# Patient Record
Sex: MALE | Race: WHITE | NOT HISPANIC OR LATINO | Employment: OTHER | URBAN - METROPOLITAN AREA
[De-identification: names, ages, dates, MRNs, and addresses within clinical notes are randomized per-mention and may not be internally consistent; named-entity substitution may affect disease eponyms.]

---

## 2022-01-14 ENCOUNTER — HOSPITAL ENCOUNTER (EMERGENCY)
Facility: MEDICAL CENTER | Age: 82
End: 2022-01-14
Attending: EMERGENCY MEDICINE
Payer: MEDICARE

## 2022-01-14 ENCOUNTER — APPOINTMENT (OUTPATIENT)
Dept: RADIOLOGY | Facility: MEDICAL CENTER | Age: 82
End: 2022-01-14
Attending: EMERGENCY MEDICINE
Payer: MEDICARE

## 2022-01-14 VITALS
HEART RATE: 77 BPM | WEIGHT: 171.3 LBS | TEMPERATURE: 97.2 F | BODY MASS INDEX: 27.53 KG/M2 | HEIGHT: 66 IN | SYSTOLIC BLOOD PRESSURE: 167 MMHG | DIASTOLIC BLOOD PRESSURE: 101 MMHG | RESPIRATION RATE: 16 BRPM | OXYGEN SATURATION: 94 %

## 2022-01-14 DIAGNOSIS — R07.89 LEFT-SIDED CHEST WALL PAIN: ICD-10-CM
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
## 2022-01-14 LAB
ALBUMIN SERPL BCP-MCNC: 4.2 G/DL (ref 3.2–4.9)
ALBUMIN/GLOB SERPL: 1.4 G/DL
ALP SERPL-CCNC: 91 U/L (ref 30–99)
ALT SERPL-CCNC: 18 U/L (ref 2–50)
ANION GAP SERPL CALC-SCNC: 14 MMOL/L (ref 7–16)
AST SERPL-CCNC: 26 U/L (ref 12–45)
BASOPHILS # BLD AUTO: 0.4 % (ref 0–1.8)
BASOPHILS # BLD: 0.05 K/UL (ref 0–0.12)
BILIRUB SERPL-MCNC: 0.5 MG/DL (ref 0.1–1.5)
BUN SERPL-MCNC: 19 MG/DL (ref 8–22)
CALCIUM SERPL-MCNC: 8.7 MG/DL (ref 8.4–10.2)
CHLORIDE SERPL-SCNC: 99 MMOL/L (ref 96–112)
CO2 SERPL-SCNC: 21 MMOL/L (ref 20–33)
CREAT SERPL-MCNC: 1.1 MG/DL (ref 0.5–1.4)
EOSINOPHIL # BLD AUTO: 0.05 K/UL (ref 0–0.51)
EOSINOPHIL NFR BLD: 0.4 % (ref 0–6.9)
ERYTHROCYTE [DISTWIDTH] IN BLOOD BY AUTOMATED COUNT: 41.7 FL (ref 35.9–50)
GLOBULIN SER CALC-MCNC: 2.9 G/DL (ref 1.9–3.5)
GLUCOSE SERPL-MCNC: 118 MG/DL (ref 65–99)
HCT VFR BLD AUTO: 39.6 % (ref 42–52)
HGB BLD-MCNC: 13.9 G/DL (ref 14–18)
IMM GRANULOCYTES # BLD AUTO: 0.07 K/UL (ref 0–0.11)
IMM GRANULOCYTES NFR BLD AUTO: 0.6 % (ref 0–0.9)
INR PPP: 1.11 (ref 0.87–1.13)
LYMPHOCYTES # BLD AUTO: 0.93 K/UL (ref 1–4.8)
LYMPHOCYTES NFR BLD: 8.3 % (ref 22–41)
MCH RBC QN AUTO: 31.1 PG (ref 27–33)
MCHC RBC AUTO-ENTMCNC: 35.1 G/DL (ref 33.7–35.3)
MCV RBC AUTO: 88.6 FL (ref 81.4–97.8)
MONOCYTES # BLD AUTO: 0.86 K/UL (ref 0–0.85)
MONOCYTES NFR BLD AUTO: 7.7 % (ref 0–13.4)
NEUTROPHILS # BLD AUTO: 9.23 K/UL (ref 1.82–7.42)
NEUTROPHILS NFR BLD: 82.6 % (ref 44–72)
NRBC # BLD AUTO: 0 K/UL
NRBC BLD-RTO: 0 /100 WBC
PLATELET # BLD AUTO: 235 K/UL (ref 164–446)
PMV BLD AUTO: 9.4 FL (ref 9–12.9)
POTASSIUM SERPL-SCNC: 3.6 MMOL/L (ref 3.6–5.5)
PROT SERPL-MCNC: 7.1 G/DL (ref 6–8.2)
PROTHROMBIN TIME: 13.4 SEC (ref 12–14.6)
RBC # BLD AUTO: 4.47 M/UL (ref 4.7–6.1)
SODIUM SERPL-SCNC: 134 MMOL/L (ref 135–145)
WBC # BLD AUTO: 11.2 K/UL (ref 4.8–10.8)

## 2022-01-14 PROCEDURE — A9270 NON-COVERED ITEM OR SERVICE: HCPCS | Performed by: EMERGENCY MEDICINE

## 2022-01-14 PROCEDURE — 99284 EMERGENCY DEPT VISIT MOD MDM: CPT

## 2022-01-14 PROCEDURE — 70450 CT HEAD/BRAIN W/O DYE: CPT | Mod: ME

## 2022-01-14 PROCEDURE — 36415 COLL VENOUS BLD VENIPUNCTURE: CPT

## 2022-01-14 PROCEDURE — 72131 CT LUMBAR SPINE W/O DYE: CPT | Mod: ME

## 2022-01-14 PROCEDURE — 72125 CT NECK SPINE W/O DYE: CPT | Mod: ME

## 2022-01-14 PROCEDURE — 71260 CT THORAX DX C+: CPT | Mod: ME

## 2022-01-14 PROCEDURE — 85610 PROTHROMBIN TIME: CPT

## 2022-01-14 PROCEDURE — 80053 COMPREHEN METABOLIC PANEL: CPT

## 2022-01-14 PROCEDURE — 700102 HCHG RX REV CODE 250 W/ 637 OVERRIDE(OP): Performed by: EMERGENCY MEDICINE

## 2022-01-14 PROCEDURE — 85025 COMPLETE CBC W/AUTO DIFF WBC: CPT

## 2022-01-14 PROCEDURE — 72128 CT CHEST SPINE W/O DYE: CPT | Mod: ME

## 2022-01-14 PROCEDURE — 700117 HCHG RX CONTRAST REV CODE 255: Performed by: EMERGENCY MEDICINE

## 2022-01-14 RX ORDER — HYDROCODONE BITARTRATE AND ACETAMINOPHEN 5; 325 MG/1; MG/1
1 TABLET ORAL ONCE
Status: COMPLETED | OUTPATIENT
Start: 2022-01-14 | End: 2022-01-14

## 2022-01-14 RX ORDER — METAXALONE 800 MG/1
400 TABLET ORAL 3 TIMES DAILY PRN
Qty: 20 TABLET | Refills: 0 | Status: SHIPPED | OUTPATIENT
Start: 2022-01-14 | End: 2022-01-19

## 2022-01-14 RX ORDER — HYDROCODONE BITARTRATE AND ACETAMINOPHEN 5; 325 MG/1; MG/1
1 TABLET ORAL EVERY 6 HOURS PRN
Qty: 13 TABLET | Refills: 0 | Status: SHIPPED | OUTPATIENT
Start: 2022-01-14 | End: 2022-01-17

## 2022-01-14 RX ADMIN — HYDROCODONE BITARTRATE AND ACETAMINOPHEN 1 TABLET: 5; 325 TABLET ORAL at 19:04

## 2022-01-14 RX ADMIN — IOHEXOL 100 ML: 350 INJECTION, SOLUTION INTRAVENOUS at 20:06

## 2022-01-15 NOTE — ED TRIAGE NOTES
Pt BIB family  Pt slipped and fell on ice today  injured L side  Continues to have L upper back pain and now having difficulty at taking deep breaths  Had a hard time getting up for laying down later today after laying srinath to rest

## 2022-01-15 NOTE — DISCHARGE INSTRUCTIONS
Please return to the emergency department tomorrow if your pain is not well controlled despite taking the pain medication.  As we discussed the medications may make you drowsy, please exercise caution to make sure that you do not fall while on these medications.  Please call your primary care physician when you return home for complete recheck.

## 2022-01-15 NOTE — ED PROVIDER NOTES
ED Provider Note    Chief Complaint:   Fall, left chest wall pain    HPI:  Dangelo Gutierrez Jr. is a very pleasant 81-year-old gentleman who presents to the emergency department for evaluation of left-sided chest wall pain after a slip and fall on ice earlier today.  He fell this morning, and has had persistent pain localized to the left chest wall.  He reports no hip pain, no difficulty with ambulation, no lower extremity pain.  He does have a small bruise to the left elbow, but is easily able to flex and extend the elbow without significant pain or discomfort.  He is not currently on any anticoagulation or antiplatelet agents.  States that he did not strike his head, and is not having neck pain or midline back pain.  He reports no weakness in the arms or legs.  He reports no facial injury.  He has no other concerns at this time.  He states that his chest wall pain is improved when laying still, however he describes exacerbation with movement, a sharp shooting pain that is moderate to severe with movement.  He reports no significant past medical history.  He did not sustain any lacerations or abrasions.  He has no other concerns at this time.  His fall was due to a slip on ice, he had no preceding lightheadedness, no loss of balance, no chest pain or palpitations.    Review of Systems:  See HPI for pertinent positives and negatives. All other systems negative.    Past Medical History:   has a past medical history of [place] (hard of hearing).    Social History:  Social History     Tobacco Use   • Smoking status: Never Smoker   • Smokeless tobacco: Never Used   Substance and Sexual Activity   • Alcohol use: Not Currently   • Drug use: Never   • Sexual activity: Not on file       Surgical History:   has a past surgical history that includes other orthopedic surgery; other abdominal surgery; other; other; other; other; and other.    Current Medications:  Home Medications    **Home medications have not yet been  "reviewed for this encounter**         Allergies:  Allergies   Allergen Reactions   • Ciprofloxacin Hives and Diarrhea     Hives and diarrhea   • Keflex Diarrhea     diarrhea       Physical Exam:  Vital Signs: BP (!) 167/101   Pulse 77   Temp 36.2 °C (97.2 °F) (Temporal)   Resp 16   Ht 1.676 m (5' 6\")   Wt 77.7 kg (171 lb 4.8 oz)   SpO2 94%   BMI 27.65 kg/m²   Constitutional: Alert, no acute distress  HENT: Atraumatic, no scalp hematoma, hard of hearing  Eyes: Pupils equal and reactive, normal conjunctiva  Neck: Supple, normal range of motion, no stridor  Cardiovascular: Extremities are warm and well perfused, no murmur appreciated, normal cardiac auscultation  Pulmonary: No respiratory distress, normal work of breathing, no accessory muscule usage, breath sounds clear and equal bilaterally  Abdomen: Soft, non-distended, non-tender to palpation, no peritoneal signs  Skin: Warm, dry, no rashes or lesions  Musculoskeletal: Normal range of motion in all extremities, no swelling or deformity noted, small bruise to the left elbow, no visible deformity to the left chest wall, no flail chest, he does have tenderness to palpation along the lateral aspect of the left chest, in the area of the ribs 4 through 8.  No overlying lacerations, no visible bruising.  Neurologic: Alert, oriented, normal speech, normal motor function  Psychiatric: Normal and appropriate mood and affect    Medical records reviewed for continuity of care. No recent visits to this facility available for review.    Labs:  Labs Reviewed   CBC WITH DIFFERENTIAL - Abnormal; Notable for the following components:       Result Value    WBC 11.2 (*)     RBC 4.47 (*)     Hemoglobin 13.9 (*)     Hematocrit 39.6 (*)     Neutrophils-Polys 82.60 (*)     Lymphocytes 8.30 (*)     Neutrophils (Absolute) 9.23 (*)     Lymphs (Absolute) 0.93 (*)     Monos (Absolute) 0.86 (*)     All other components within normal limits    Narrative:     Collected By:  Indicate which " anticoagulants the patient is on:->NONE   COMP METABOLIC PANEL - Abnormal; Notable for the following components:    Sodium 134 (*)     Glucose 118 (*)     All other components within normal limits    Narrative:     Collected By:  Indicate which anticoagulants the patient is on:->NONE   PROTHROMBIN TIME    Narrative:     Collected By:  Indicate which anticoagulants the patient is on:->NONE   ESTIMATED GFR    Narrative:     Collected By:  Indicate which anticoagulants the patient is on:->NONE       Radiology:  CT-TSPINE W/O PLUS RECONS   Final Result         1.  No acute traumatic bony injury of the thoracic spine.   2.  Atherosclerosis      CT-CHEST,ABDOMEN,PELVIS WITH   Final Result         1.  No significant acute abnormality in thorax, abdomen and pelvis CT scan.   2.  Fat-containing left inguinal hernia   3.  Segment 2 duodenal diverticula   4.  Atherosclerosis and atherosclerotic coronary artery disease      CT-LSPINE W/O PLUS RECONS   Final Result         1.  No acute traumatic bony injury of the lumbar spine.      CT-HEAD W/O   Final Result      1.  Cerebral atrophy.      2.  White matter lucencies most consistent with small vessel ischemic change versus demyelination or gliosis.      3.  Otherwise, Head CT without contrast with no acute findings. No evidence of acute cerebral infarction, hemorrhage or mass lesion.         CT-CSPINE WITHOUT PLUS RECONS   Final Result      Degenerative change without evidence of cervical spine fracture.           ED Medications Administered:  Medications   HYDROcodone-acetaminophen (NORCO) 5-325 MG per tablet 1 Tablet (1 Tablet Oral Given 1/14/22 1904)   iohexol (OMNIPAQUE) 350 mg/mL (IV) (100 mL Intravenous Given 1/14/22 2006)       Differential diagnosis:  Pneumothorax, rib fractures, no evidence of hip or pelvic injury, closed head injury around my differential given age.    MDM:  Mr. Gutierrez presents to the emergency department today for evaluation of left-sided chest wall  pain after a fall on ice.  His physical exam is reassuring, he has no tachycardia, no hypotension, he has normal pulmonary auscultation, no evidence of tension pneumothorax on my initial assessment.  He does have tenderness to palpation of the left chest wall.  Given his age and concern for the possibility of multiple rib fractures I did obtain a CT, as he is at high risk of morbidity if he does have multiple fractures which may not be found on plain film of the chest.    Screening lab work is reassuring with no significant abnormalities in CMP, INR is within normal limits, white blood count is just above normal reference range suspect this may be reactive due to pain.  He has a normal platelet count.    CT of the head does not show any evidence of acute injury, no intracranial bleed noted.  He is not on anticoagulation, do not believe any further observation or brain imaging is necessary.  CT of the cervical, thoracic and lumbar spine are negative for acute bony injury.  Fortunately, CT of the chest shows no evidence of traumatic pneumothorax and no bony injury.    He is given a dose of hydrocodone in the emergency department for pain.  He states that he has taken Skelaxin previously for muscle spasms, and that this medication has worked very well for him in the past.  I provided him with a prescription for this medication, as well as a small amount of hydrocodone for pain.  We had a discussion about the side effects of these medications including drowsiness, he states that he has taken these medications before and understands that they can lead to falls.  He states he typically tries to walk with hand on something to steady himself, and usually does not have an issue with falls.  Plan at this time is for discharge home.  He is from [place], and is scheduled to return back in 2 days on Sunday.  He is counseled call his primary care physician for complete recheck.  If he has any issues before returning home on  Sunday, he will return to the emergency department for complete recheck. Return precautions were discussed with the patient, and provided in written form with the patient's discharge instructions.     Personal protective equipment including N95 surgical respirator, goggles, and gloves were used during this encounter.       Disposition:  Discharged home in stable condition    Final Impression:  1. Fall, initial encounter    2. Left-sided chest wall pain        Electronically signed by: Emily Woodruff MD, 1/14/2022 9:31 PM